# Patient Record
Sex: MALE | Race: OTHER | HISPANIC OR LATINO | Employment: UNEMPLOYED | URBAN - METROPOLITAN AREA
[De-identification: names, ages, dates, MRNs, and addresses within clinical notes are randomized per-mention and may not be internally consistent; named-entity substitution may affect disease eponyms.]

---

## 2017-07-22 ENCOUNTER — HOSPITAL ENCOUNTER (EMERGENCY)
Facility: MEDICAL CENTER | Age: 22
End: 2017-07-22
Attending: EMERGENCY MEDICINE
Payer: MEDICAID

## 2017-07-22 ENCOUNTER — APPOINTMENT (OUTPATIENT)
Dept: RADIOLOGY | Facility: MEDICAL CENTER | Age: 22
End: 2017-07-22
Attending: EMERGENCY MEDICINE
Payer: MEDICAID

## 2017-07-22 VITALS
DIASTOLIC BLOOD PRESSURE: 71 MMHG | HEIGHT: 66 IN | TEMPERATURE: 98.2 F | SYSTOLIC BLOOD PRESSURE: 136 MMHG | WEIGHT: 165 LBS | RESPIRATION RATE: 16 BRPM | BODY MASS INDEX: 26.52 KG/M2 | HEART RATE: 94 BPM | OXYGEN SATURATION: 96 %

## 2017-07-22 DIAGNOSIS — S02.2XXA CLOSED FRACTURE OF NASAL BONE, INITIAL ENCOUNTER: ICD-10-CM

## 2017-07-22 DIAGNOSIS — T14.8XXA ABRASION: ICD-10-CM

## 2017-07-22 DIAGNOSIS — S09.8XXA BLUNT HEAD TRAUMA, INITIAL ENCOUNTER: ICD-10-CM
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
## 2017-07-22 PROCEDURE — 99284 EMERGENCY DEPT VISIT MOD MDM: CPT

## 2017-07-22 PROCEDURE — 70450 CT HEAD/BRAIN W/O DYE: CPT

## 2017-07-22 PROCEDURE — 70486 CT MAXILLOFACIAL W/O DYE: CPT

## 2017-07-22 RX ORDER — NAPROXEN 500 MG/1
500 TABLET ORAL 2 TIMES DAILY WITH MEALS
Qty: 14 TAB | Refills: 0 | Status: SHIPPED | OUTPATIENT
Start: 2017-07-22

## 2017-07-22 RX ORDER — HYDROCODONE BITARTRATE AND ACETAMINOPHEN 5; 325 MG/1; MG/1
1 TABLET ORAL EVERY 4 HOURS PRN
Qty: 20 TAB | Refills: 0 | Status: SHIPPED | OUTPATIENT
Start: 2017-07-22

## 2017-07-22 ASSESSMENT — PAIN SCALES - GENERAL
PAINLEVEL_OUTOF10: 0
PAINLEVEL_OUTOF10: 6
PAINLEVEL_OUTOF10: 0

## 2017-07-22 ASSESSMENT — LIFESTYLE VARIABLES
DO YOU DRINK ALCOHOL: YES
REASON UNABLE TO ASSESS: REFUSED

## 2017-07-22 NOTE — ED NOTES
Discharge instructions given.  All questions answered.  Prescriptions given x2.  Pt to follow-up with Plastic surgery on monday.  Pt verbalized understanding.  All belongings with pt.  Pt ambulated out with officer, pt in custody.

## 2017-07-22 NOTE — ED AVS SNAPSHOT
7/22/2017    Jeramy Aviles  [address] [place] [address]    Dear Jeramy:    [facility] wants to ensure your discharge home is safe and you or your loved ones have had all of your questions answered regarding your care after you leave the hospital.    Below is a list of resources and contact information should you have any questions regarding your hospital stay, follow-up instructions, or active medical symptoms.    Questions or Concerns Regarding… Contact   Medical Questions Related to Your Discharge  (7 days a week, 8am-5pm) Contact a Nurse Care Coordinator   [phone]   Medical Questions Not Related to Your Discharge  (24 hours a day / 7 days a week)  Contact the Nurse Health Line   [phone]    Medications or Discharge Instructions Refer to your discharge packet   or contact your [facility] Primary Care Provider   [phone]   Follow-up Appointment(s) Schedule your appointment via MeBe[employer]   or contact Scheduling [phone]   Billing Review your statement via MeBeam  or contact Billing [phone]   Medical Records Review your records via MeBeam   or contact Medical Records [phone]     You may receive a telephone call within two days of discharge. This call is to make certain you understand your discharge instructions and have the opportunity to have any questions answered. You can also easily access your medical information, test results and upcoming appointments via the MeBeam free online health management tool. You can learn more and sign up at [email or web address]. For assistance setting up your MeBeam account, please call [phone].    Once again, we want to ensure your discharge home is safe and that you have a clear understanding of any next steps in your care. If you have any questions or concerns, please do not hesitate to contact us, we are here for you. Thank you for choosing [facility] for your healthcare needs.    Sincerely,    Your [facility] Healthcare Team
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
## 2017-07-22 NOTE — DISCHARGE INSTRUCTIONS
Concussion, Adult  A concussion, or closed-head injury, is a brain injury caused by a direct blow to the head or by a quick and sudden movement (jolt) of the head or neck. Concussions are usually not life-threatening. Even so, the effects of a concussion can be serious. If you have had a concussion before, you are more likely to experience concussion-like symptoms after a direct blow to the head.   CAUSES  · Direct blow to the head, such as from running into another player during a soccer game, being hit in a fight, or hitting your head on a hard surface.  · A jolt of the head or neck that causes the brain to move back and forth inside the skull, such as in a car crash.  SIGNS AND SYMPTOMS  The signs of a concussion can be hard to notice. Early on, they may be missed by you, family members, and health care providers. You may look fine but act or feel differently.  Symptoms are usually temporary, but they may last for days, weeks, or even longer. Some symptoms may appear right away while others may not show up for hours or days. Every head injury is different. Symptoms include:  · Mild to moderate headaches that will not go away.  · A feeling of pressure inside your head.  · Having more trouble than usual:  · Learning or remembering things you have heard.  · Answering questions.  · Paying attention or concentrating.  · Organizing daily tasks.  · Making decisions and solving problems.  · Slowness in thinking, acting or reacting, speaking, or reading.  · Getting lost or being easily confused.  · Feeling tired all the time or lacking energy (fatigued).  · Feeling drowsy.  · Sleep disturbances.  · Sleeping more than usual.  · Sleeping less than usual.  · Trouble falling asleep.  · Trouble sleeping (insomnia).  · Loss of balance or feeling lightheaded or dizzy.  · Nausea or vomiting.  · Numbness or tingling.  · Increased sensitivity to:  · Sounds.  · Lights.  · Distractions.  · Vision problems or eyes that tire  easily.  · Diminished sense of taste or smell.  · Ringing in the ears.  · Mood changes such as feeling sad or anxious.  · Becoming easily irritated or angry for little or no reason.  · Lack of motivation.  · Seeing or hearing things other people do not see or hear (hallucinations).  DIAGNOSIS  Your health care provider can usually diagnose a concussion based on a description of your injury and symptoms. He or she will ask whether you passed out (lost consciousness) and whether you are having trouble remembering events that happened right before and during your injury.  Your evaluation might include:  · A brain scan to look for signs of injury to the brain. Even if the test shows no injury, you may still have a concussion.  · Blood tests to be sure other problems are not present.  TREATMENT  · Concussions are usually treated in an emergency department, in urgent care, or at a clinic. You may need to stay in the hospital overnight for further treatment.  · Tell your health care provider if you are taking any medicines, including prescription medicines, over-the-counter medicines, and natural remedies. Some medicines, such as blood thinners (anticoagulants) and aspirin, may increase the chance of complications. Also tell your health care provider whether you have had alcohol or are taking illegal drugs. This information may affect treatment.  · Your health care provider will send you home with important instructions to follow.  · How fast you will recover from a concussion depends on many factors. These factors include how severe your concussion is, what part of your brain was injured, your age, and how healthy you were before the concussion.  · Most people with mild injuries recover fully. Recovery can take time. In general, recovery is slower in older persons. Also, persons who have had a concussion in the past or have other medical problems may find that it takes longer to recover from their current injury.  HOME  CARE INSTRUCTIONS  General Instructions  · Carefully follow the directions your health care provider gave you.  · Only take over-the-counter or prescription medicines for pain, discomfort, or fever as directed by your health care provider.  · Take only those medicines that your health care provider has approved.  · Do not drink alcohol until your health care provider says you are well enough to do so. Alcohol and certain other drugs may slow your recovery and can put you at risk of further injury.  · If it is harder than usual to remember things, write them down.  · If you are easily distracted, try to do one thing at a time. For example, do not try to watch TV while fixing dinner.  · Talk with family members or close friends when making important decisions.  · Keep all follow-up appointments. Repeated evaluation of your symptoms is recommended for your recovery.  · Watch your symptoms and tell others to do the same. Complications sometimes occur after a concussion. Older adults with a brain injury may have a higher risk of serious complications, such as a blood clot on the brain.  · Tell your teachers, school nurse, school counselor, , , or  about your injury, symptoms, and restrictions. Tell them about what you can or cannot do. They should watch for:  ¨ Increased problems with attention or concentration.  ¨ Increased difficulty remembering or learning new information.  ¨ Increased time needed to complete tasks or assignments.  ¨ Increased irritability or decreased ability to cope with stress.  ¨ Increased symptoms.  · Rest. Rest helps the brain to heal. Make sure you:  ¨ Get plenty of sleep at night. Avoid staying up late at night.  ¨ Keep the same bedtime hours on weekends and weekdays.  ¨ Rest during the day. Take daytime naps or rest breaks when you feel tired.  · Limit activities that require a lot of thought or concentration. These include:  ¨ Doing homework or job-related  work.  ¨ Watching TV.  ¨ Working on the computer.  · Avoid any situation where there is potential for another head injury (football, hockey, soccer, basketball, martial arts, downhill snow sports and horseback riding). Your condition will get worse every time you experience a concussion. You should avoid these activities until you are evaluated by the appropriate follow-up health care providers.  Returning To Your Regular Activities  You will need to return to your normal activities slowly, not all at once. You must give your body and brain enough time for recovery.  · Do not return to sports or other athletic activities until your health care provider tells you it is safe to do so.  · Ask your health care provider when you can drive, ride a bicycle, or operate heavy machinery. Your ability to react may be slower after a brain injury. Never do these activities if you are dizzy.  · Ask your health care provider about when you can return to work or school.  Preventing Another Concussion  It is very important to avoid another brain injury, especially before you have recovered. In rare cases, another injury can lead to permanent brain damage, brain swelling, or death. The risk of this is greatest during the first 7-10 days after a head injury. Avoid injuries by:  · Wearing a seat belt when riding in a car.  · Drinking alcohol only in moderation.  · Wearing a helmet when biking, skiing, skateboarding, skating, or doing similar activities.  · Avoiding activities that could lead to a second concussion, such as contact or recreational sports, until your health care provider says it is okay.  · Taking safety measures in your home.  ¨ Remove clutter and tripping hazards from floors and stairways.  ¨ Use grab bars in bathrooms and handrails by stairs.  ¨ Place non-slip mats on floors and in bathtubs.  ¨ Improve lighting in dim areas.  SEEK MEDICAL CARE IF:  · You have increased problems paying attention or  concentrating.  · You have increased difficulty remembering or learning new information.  · You need more time to complete tasks or assignments than before.  · You have increased irritability or decreased ability to cope with stress.  · You have more symptoms than before.  Seek medical care if you have any of the following symptoms for more than 2 weeks after your injury:  · Lasting (chronic) headaches.  · Dizziness or balance problems.  · Nausea.  · Vision problems.  · Increased sensitivity to noise or light.  · Depression or mood swings.  · Anxiety or irritability.  · Memory problems.  · Difficulty concentrating or paying attention.  · Sleep problems.  · Feeling tired all the time.  SEEK IMMEDIATE MEDICAL CARE IF:  · You have severe or worsening headaches. These may be a sign of a blood clot in the brain.  · You have weakness (even if only in one hand, leg, or part of the face).  · You have numbness.  · You have decreased coordination.  · You vomit repeatedly.  · You have increased sleepiness.  · One pupil is larger than the other.  · You have convulsions.  · You have slurred speech.  · You have increased confusion. This may be a sign of a blood clot in the brain.  · You have increased restlessness, agitation, or irritability.  · You are unable to recognize people or places.  · You have neck pain.  · It is difficult to wake you up.  · You have unusual behavior changes.  · You lose consciousness.  MAKE SURE YOU:  · Understand these instructions.  · Will watch your condition.  · Will get help right away if you are not doing well or get worse.     This information is not intended to replace advice given to you by your health care provider. Make sure you discuss any questions you have with your health care provider.     Document Released: 03/09/2005 Document Revised: 01/08/2016 Document Reviewed: 07/10/2014  [employer] Interactive Patient Education ©2016 [employer]    Facial Fracture  A facial fracture is a break in  one of the bones of your face.  HOME CARE INSTRUCTIONS   · Protect the injured part of your face until it is healed.  · Do not participate in activities which give chance for re-injury until your doctor approves.  · Gently wash and dry your face.  · Wear head and facial protection while riding a bicycle, motorcycle, or snowmobile.  SEEK MEDICAL CARE IF:   · An oral temperature above 102° F (38.9° C) develops.  · You have severe headaches or notice changes in your vision.  · You have new numbness or tingling in your face.  · You develop nausea (feeling sick to your stomach), vomiting or a stiff neck.  SEEK IMMEDIATE MEDICAL CARE IF:   · You develop difficulty seeing or experience double vision.  · You become dizzy, lightheaded, or faint.  · You develop trouble speaking, breathing, or swallowing.  · You have a watery discharge from your nose or ear.  MAKE SURE YOU:   · Understand these instructions.  · Will watch your condition.  · Will get help right away if you are not doing well or get worse.  Document Released: 12/18/2006 Document Revised: 03/11/2013 Document Reviewed: 08/06/2009  ExitCare® Patient Information ©2014 [employer].    Nasal Fracture  A nasal fracture is a break or crack in the bones of the nose. A minor break usually heals in a month. You often will receive black eyes from a nasal fracture. This is not a cause for concern. The black eyes will go away over 1 to 2 weeks.   DIAGNOSIS   Your caregiver may want to examine you if you are concerned about a fracture of the nose. X-rays of the nose may not show a nasal fracture even when one is present. Sometimes your caregiver must wait 1 to 5 days after the injury to re-check the nose for alignment and to take additional X-rays. Sometimes the caregiver must wait until the swelling has gone down.  TREATMENT  Minor fractures that have caused no deformity often do not require treatment. More serious fractures where bones are displaced may require surgery.  This will take place after the swelling is gone. Surgery will stabilize and align the fracture.  HOME CARE INSTRUCTIONS   · Put ice on the injured area.  ¨ Put ice in a plastic bag.  ¨ Place a towel between your skin and the bag.  ¨ Leave the ice on for 15-20 minutes, 03-04 times a day.  · Take medications as directed by your caregiver.  · Only take over-the-counter or prescription medicines for pain, discomfort, or fever as directed by your caregiver.  · If your nose starts bleeding, squeeze the soft parts of the nose against the center wall while you are sitting in an upright position for 10 minutes.  · Contact sports should be avoided for at least 3 to 4 weeks or as directed by your caregiver.  SEEK MEDICAL CARE IF:  · Your pain increases or becomes severe.  · You continue to have nosebleeds.  · The shape of your nose does not return to normal within 5 days.  · You have pus draining from the nose.  SEEK IMMEDIATE MEDICAL CARE IF:   · You have bleeding from your nose that does not stop after 20 minutes of pinching the nostrils closed and keeping ice on the nose.  · You have clear fluid draining from your nose.  · You notice a grape-like swelling on the dividing wall between the nostrils (septum). This is a collection of blood (hematoma) that must be drained to help prevent infection.  · You have difficulty moving your eyes.  · You have recurrent vomiting.     This information is not intended to replace advice given to you by your health care provider. Make sure you discuss any questions you have with your health care provider.     Document Released: 12/15/2001 Document Revised: 03/11/2013 Document Reviewed: 01/25/2016  [employer] Interactive Patient Education ©2016 [email or web address]

## 2017-07-22 NOTE — ED NOTES
Pt continue to await re-eval.  [place] in [place], no complaints or needs voiced.  Officer remains at bedside.
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
## 2017-07-22 NOTE — ED AVS SNAPSHOT
EPV SOLAR Access Code: [email or web address]  Expires: 8/21/2017  2:27 PM    Your email address is not on file at [facility].  Email Addresses are required for you to sign up for EPV SOLAR, please contact [phone] to verify your personal information and to provide your email address prior to attempting to register for EPV SOLAR.    Jeramy Aviles  [address] [place] [address]    EPV SOLAR  A secure, online tool to manage your health information     [facility]’s EPV SOLAR® is a secure, online tool that connects you to your personalized health information from the privacy of your home -- day or night - making it very easy for you to manage your healthcare. Once the activation process is completed, you can even access your medical information using the EPV SOLAR herman, which is available for free in the Apple Herman store or Google Play store.     To learn more about EPV SOLAR, visit [email or web address]    There are two levels of access available (as shown below):   My Chart Features  [facility] Primary Care Doctor [facility]  Specialists [facility]  Urgent  Care Non-[facility] Primary Care Doctor   Email your healthcare team securely and privately 24/7 X X X    Manage appointments: schedule your next appointment; view details of past/upcoming appointments X      Request prescription refills. X      View recent personal medical records, including lab and immunizations X X X X   View health record, including health history, allergies, medications X X X X   Read reports about your outpatient visits, procedures, consult and ER notes X X X X   See your discharge summary, which is a recap of your hospital and/or ER visit that includes your diagnosis, lab results, and care plan X X  X     How to register for Fevert:  Once your e-mail address has been verified, follow the following steps to sign up for Fevert.     1. Go to  [email or web address]  2. Click on the Sign Up Now box, which takes you to the New Member Sign Up page. You will need  to provide the following information:  a. Enter your [facility] Access Code exactly as it appears at the top of this page. (You will not need to use this code after you’ve completed the sign-up process. If you do not sign up before the expiration date, you must request a new code.)   b. Enter your date of birth.   c. Enter your home email address.   d. Click Submit, and follow the next screen’s instructions.  3. Create a [employer] ID. This will be your [facility] login ID and cannot be changed, so think of one that is secure and easy to remember.  4. Create a [employer] password. You can change your password at any time.  5. Enter your Password Reset Question and Answer. This can be used at a later time if you forget your password.   6. Enter your e-mail address. This allows you to receive e-mail notifications when new information is available in [employer].  7. Click Sign Up. You can now view your health information.    For assistance activating your [employer] account, call [phone]

## 2017-09-28 ENCOUNTER — HOSPITAL ENCOUNTER (EMERGENCY)
Facility: MEDICAL CENTER | Age: 22
End: 2017-09-28
Attending: EMERGENCY MEDICINE
Payer: MEDICAID

## 2017-09-28 VITALS
HEART RATE: 77 BPM | OXYGEN SATURATION: 98 % | BODY MASS INDEX: 27.28 KG/M2 | RESPIRATION RATE: 16 BRPM | SYSTOLIC BLOOD PRESSURE: 135 MMHG | WEIGHT: 169.75 LBS | DIASTOLIC BLOOD PRESSURE: 81 MMHG | TEMPERATURE: 97.6 F | HEIGHT: 66 IN

## 2017-09-28 DIAGNOSIS — H65.01 RIGHT ACUTE SEROUS OTITIS MEDIA, RECURRENCE NOT SPECIFIED: ICD-10-CM

## 2017-09-28 PROCEDURE — 99283 EMERGENCY DEPT VISIT LOW MDM: CPT

## 2017-09-28 RX ORDER — AMOXICILLIN 500 MG/1
500 CAPSULE ORAL 3 TIMES DAILY
Qty: 21 CAP | Refills: 0 | Status: SHIPPED | OUTPATIENT
Start: 2017-09-28 | End: 2017-10-05

## 2017-09-28 RX ORDER — IBUPROFEN 800 MG/1
800 TABLET ORAL EVERY 8 HOURS PRN
Qty: 30 TAB | Refills: 0 | Status: SHIPPED | OUTPATIENT
Start: 2017-09-28

## 2017-09-28 RX ORDER — NEOMYCIN SULFATE, POLYMYXIN B SULFATE AND HYDROCORTISONE 10; 3.5; 1 MG/ML; MG/ML; [USP'U]/ML
3 SUSPENSION/ DROPS AURICULAR (OTIC) 3 TIMES DAILY
Qty: 1 BOTTLE | Refills: 0 | Status: SHIPPED | OUTPATIENT
Start: 2017-09-28

## 2017-09-28 ASSESSMENT — PAIN SCALES - GENERAL: PAINLEVEL_OUTOF10: 4

## 2017-09-28 NOTE — ED NOTES
Pt amb to triage.  Chief Complaint   Patient presents with   • Ear Pain     rt, onset 4am, described as throbbing, +loss of hearing

## 2017-09-28 NOTE — DISCHARGE INSTRUCTIONS
"Otitis Media With Effusion  Otitis media with effusion is the presence of fluid in the middle ear. This is a common problem in children, which often follows ear infections. It may be present for weeks or longer after the infection. Unlike an acute ear infection, otitis media with effusion refers only to fluid behind the ear drum and not infection. Children with repeated ear and sinus infections and allergy problems are the most likely to get otitis media with effusion.  CAUSES   The most frequent cause of the fluid buildup is dysfunction of the eustachian tubes. These are the tubes that drain fluid in the ears to the back of the nose (nasopharynx).  SYMPTOMS   · The main symptom of this condition is hearing loss. As a result, you or your child may:  ¨ Listen to the TV at a loud volume.  ¨ Not respond to questions.  ¨ Ask \"what\" often when spoken to.  ¨ Mistake or confuse one sound or word for another.  · There may be a sensation of fullness or pressure but usually not pain.  DIAGNOSIS   · Your health care provider will diagnose this condition by examining you or your child's ears.  · Your health care provider may test the pressure in you or your child's ear with a tympanometer.  · A hearing test may be conducted if the problem persists.  TREATMENT   · Treatment depends on the duration and the effects of the effusion.  · Antibiotics, decongestants, nose drops, and cortisone-type drugs (tablets or nasal spray) may not be helpful.  · Children with persistent ear effusions may have delayed language or behavioral problems. Children at risk for developmental delays in hearing, learning, and speech may require referral to a specialist earlier than children not at risk.  · You or your child's health care provider may suggest a referral to an ear, nose, and throat surgeon for treatment. The following may help restore normal hearing:  ¨ Drainage of fluid.  ¨ Placement of ear tubes (tympanostomy tubes).  ¨ Removal of adenoids " (adenoidectomy).  HOME CARE INSTRUCTIONS   · Avoid secondhand smoke.  · Infants who are  are less likely to have this condition.  · Avoid feeding infants while they are lying flat.  · Avoid known environmental allergens.  · Avoid people who are sick.  SEEK MEDICAL CARE IF:   · Hearing is not better in 3 months.  · Hearing is worse.  · Ear pain.  · Drainage from the ear.  · Dizziness.  MAKE SURE YOU:   · Understand these instructions.  · Will watch your condition.  · Will get help right away if you are not doing well or get worse.     This information is not intended to replace advice given to you by your health care provider. Make sure you discuss any questions you have with your health care provider.     Document Released: 01/25/2006 Document Revised: 01/08/2016 Document Reviewed: 07/15/2014  [employer] Interactive Patient Education ©2016 [employer]    Ear Drops, Adult  You have been diagnosed with a condition requiring you to put drops of medicine into your outer ear.  HOME CARE INSTRUCTIONS   · Put drops in the affected ear as instructed. After putting the drops in, you will need to lie down with the affected ear facing up for ten minutes so the drops will remain in the ear canal and run down and fill the canal. Continue using the ear drops for as long as directed by your health care provider.  · Prior to getting up, put a cotton ball gently in your ear canal. Leave enough of the cotton ball out so it can be easily removed. Do not attempt to push this down into the canal with a cotton-tipped swab or other instrument.  · Do not irrigate or wash out your ears if you have had a perforated eardrum or mastoid surgery, or unless instructed to do so by your health care provider.  · Keep appointments with your health care provider as instructed.  · Finish all medicine, or use for the length of time prescribed by your health care provider. Continue the drops even if your problem seems to be doing well after a  couple days, or continue as instructed.  SEEK MEDICAL CARE IF:  · You become worse or develop increasing pain.  · You notice any unusual drainage from your ear (particularly if the drainage has a bad smell).  · You develop hearing difficulties.  · You experience a serious form of dizziness in which you feel as if the room is spinning, and you feel nauseated (vertigo).  · The outside of your ear becomes red or swollen or both. This may be a sign of an allergic reaction.  MAKE SURE YOU:   · Understand these instructions.  · Will watch your condition.  · Will get help right away if you are not doing well or get worse.     This information is not intended to replace advice given to you by your health care provider. Make sure you discuss any questions you have with your health care provider.     Document Released: 12/12/2002 Document Revised: 01/08/2016 Document Reviewed: 07/15/2014  [employer] Interactive Patient Education ©2016 [employer].

## 2017-09-28 NOTE — ED PROVIDER NOTES
"ED Provider Note    CHIEF COMPLAINT  Chief Complaint   Patient presents with   • Ear Pain     rt, onset 4am, described as throbbing, +loss of hearing       HPI  Jeramy Aviles is a 21 y.o. male who presents for evaluation of right ear pain. The patient describes any bleeding or drainage of pus from the right ear. He is otherwise healthy with no significant medical or surgical history. He denies any direct blunt or penetrating trauma. He reports several days of dull aching pain no bleeding but he does report some subtle hearing loss. No other symptoms reported    REVIEW OF SYSTEMS  See HPI for further details. No high fevers headache neck stiffness rash meds as weight loss numbness tingling or weakness All other systems are negative.     PAST MEDICAL HISTORY  Past Medical History:   Diagnosis Date   • Ulcer ([facility])        FAMILY HISTORY  No history of bleeding disorder    SOCIAL HISTORY  Social History     Social History   • Marital status: Single     Spouse name: N/A   • Number of children: N/A   • Years of education: N/A     Social History Main Topics   • Smoking status: Current Every Day Smoker   • Smokeless tobacco: Not on file   • Alcohol use Yes      Comment: unknown amount   • Drug use: Unknown      Comment: refused   • Sexual activity: Not on file     Other Topics Concern   • Not on file     Social History Narrative   • No narrative on file     Occasional alcohol  SURGICAL HISTORY  No past surgical history on file.    CURRENT MEDICATIONS  No regular meds    ALLERGIES  No Known Allergies    PHYSICAL EXAM  VITAL SIGNS: /72   Pulse 80   Temp 36.4 °C (97.6 °F)   Resp 16   Ht 1.676 m (5' 6\")   Wt 77 kg (169 lb 12.1 oz)   SpO2 98%   BMI 27.40 kg/m²  Room air O2: 98    Constitutional: Well developed, Well nourished, No acute distress, Non-toxic appearance.   HENT: Normocephalic, Atraumatic, Bilateral external ears normal, Oropharynx moist, No oral exudates, Nose normal. Right tympanic membrane is " erythematous and bulging no obvious perforation  Eyes: PERRLA, EOMI, Conjunctiva normal, No discharge.   Neck: Normal range of motion, No tenderness, Supple, No stridor.   Cardiovascular: Normal heart rate, Normal rhythm, No murmurs, No rubs, No gallops.   Thorax & Lungs: Normal breath sounds, No respiratory distress, No wheezing, No chest tenderness.   Abdomen: Bowel sounds normal, Soft, No tenderness, No masses, No pulsatile masses.   Skin: Warm, Dry, No erythema, No rash.   Back: No tenderness, No CVA tenderness.   Extremities: Intact distal pulses, No edema, No tenderness, No cyanosis, No clubbing.   Musculoskeletal: Good range of motion in all major joints. No tenderness to palpation or major deformities noted.   Neurologic: Alert & oriented x 3, Normal motor function, Normal sensory function, No focal deficits noted.   Psychiatric: Affect normal, Judgment normal, Mood normal.       COURSE & MEDICAL DECISION MAKING  Patient clinically here as otitis media I do not see any obvious perforation but that could be imminent. I will start him on high-dose antibiotics and Cortisporin otic as well as ibuprofen. Return precautions have been reviewed    FINAL IMPRESSION  1.   1. Right acute serous otitis media, recurrence not specified               Electronically signed by: Seb Girard, 9/28/2017 3:20 PM

## 2017-09-28 NOTE — ED NOTES
Discharge instructions given. Verbalizes understanding. Left with all belongings. Walked to [facility].

## 2023-05-22 ENCOUNTER — APPOINTMENT (OUTPATIENT)
Dept: RADIOLOGY | Facility: MEDICAL CENTER | Age: 28
End: 2023-05-22
Attending: EMERGENCY MEDICINE

## 2023-05-22 ENCOUNTER — HOSPITAL ENCOUNTER (EMERGENCY)
Facility: MEDICAL CENTER | Age: 28
End: 2023-05-22
Attending: EMERGENCY MEDICINE

## 2023-05-22 VITALS
HEIGHT: 66 IN | BODY MASS INDEX: 33.87 KG/M2 | WEIGHT: 210.76 LBS | RESPIRATION RATE: 18 BRPM | SYSTOLIC BLOOD PRESSURE: 106 MMHG | OXYGEN SATURATION: 97 % | DIASTOLIC BLOOD PRESSURE: 59 MMHG | HEART RATE: 71 BPM | TEMPERATURE: 97.8 F

## 2023-05-22 DIAGNOSIS — S16.1XXA STRAIN OF NECK MUSCLE, INITIAL ENCOUNTER: ICD-10-CM
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
## 2023-05-22 DIAGNOSIS — S40.011A CONTUSION OF RIGHT SHOULDER, INITIAL ENCOUNTER: ICD-10-CM

## 2023-05-22 DIAGNOSIS — S00.83XA CONTUSION OF FACE, INITIAL ENCOUNTER: ICD-10-CM

## 2023-05-22 DIAGNOSIS — S09.90XA CLOSED HEAD INJURY, INITIAL ENCOUNTER: ICD-10-CM

## 2023-05-22 PROCEDURE — 70486 CT MAXILLOFACIAL W/O DYE: CPT

## 2023-05-22 PROCEDURE — 70450 CT HEAD/BRAIN W/O DYE: CPT

## 2023-05-22 PROCEDURE — 99283 EMERGENCY DEPT VISIT LOW MDM: CPT

## 2023-05-22 PROCEDURE — 72125 CT NECK SPINE W/O DYE: CPT

## 2023-05-22 PROCEDURE — 71045 X-RAY EXAM CHEST 1 VIEW: CPT

## 2023-05-22 PROCEDURE — 73030 X-RAY EXAM OF SHOULDER: CPT | Mod: LT
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
## 2023-05-23 NOTE — DISCHARGE INSTRUCTIONS
Drink plenty of fluids to stay well-hydrated.    Take over-the-counter Tylenol and ibuprofen for discomfort.    Follow-up with the [facility] clinic within the next 1 to 2 days.  Please call for appointment.    Return to the ER for any worsening headache, changing headache, vertigo or sensation of spinning, double vision, hearing loss, vomiting, a feeling of being off balance with walking, worsening neck pain, shortness of breath, or for any concerns.

## 2023-05-23 NOTE — ED NOTES
BEDSIDE REPORT RECEIVED FROM MEKA MERCER     PT IS RESTING IN BED. BREATHING IS EVEN AND UNLABORED, SKIN IS WARM AND DRY, VSS, NAD, WILL CONTINUE TO MONITOR.